# Patient Record
Sex: MALE | URBAN - METROPOLITAN AREA
[De-identification: names, ages, dates, MRNs, and addresses within clinical notes are randomized per-mention and may not be internally consistent; named-entity substitution may affect disease eponyms.]

---

## 2022-09-28 ENCOUNTER — CLAIMS CREATED FROM THE CLAIM WINDOW (OUTPATIENT)
Dept: URBAN - METROPOLITAN AREA CLINIC 113 | Facility: CLINIC | Age: 72
End: 2022-09-28
Payer: MEDICARE

## 2022-09-28 ENCOUNTER — DASHBOARD ENCOUNTERS (OUTPATIENT)
Age: 72
End: 2022-09-28

## 2022-09-28 ENCOUNTER — LAB OUTSIDE AN ENCOUNTER (OUTPATIENT)
Dept: URBAN - METROPOLITAN AREA CLINIC 113 | Facility: CLINIC | Age: 72
End: 2022-09-28

## 2022-09-28 ENCOUNTER — TELEPHONE ENCOUNTER (OUTPATIENT)
Dept: URBAN - METROPOLITAN AREA CLINIC 113 | Facility: CLINIC | Age: 72
End: 2022-09-28

## 2022-09-28 VITALS
DIASTOLIC BLOOD PRESSURE: 72 MMHG | HEART RATE: 66 BPM | TEMPERATURE: 97.1 F | WEIGHT: 175 LBS | BODY MASS INDEX: 25.05 KG/M2 | SYSTOLIC BLOOD PRESSURE: 130 MMHG | RESPIRATION RATE: 22 BRPM | HEIGHT: 70 IN

## 2022-09-28 DIAGNOSIS — K52.9 CHRONIC DIARRHEA: ICD-10-CM

## 2022-09-28 DIAGNOSIS — K86.1 CHRONIC PANCREATITIS, UNSPECIFIED PANCREATITIS TYPE: ICD-10-CM

## 2022-09-28 DIAGNOSIS — R19.7 ACUTE DIARRHEA: ICD-10-CM

## 2022-09-28 PROBLEM — 235494005: Status: ACTIVE | Noted: 2022-09-28

## 2022-09-28 PROCEDURE — 99204 OFFICE O/P NEW MOD 45 MIN: CPT | Performed by: STUDENT IN AN ORGANIZED HEALTH CARE EDUCATION/TRAINING PROGRAM

## 2022-09-28 RX ORDER — ZINC OXIDE 400 MG/G
AS DIRECTED PASTE TOPICAL
Status: ACTIVE | COMMUNITY

## 2022-09-28 RX ORDER — INSULIN ASPART 100 [IU]/ML
AS DIRECTED INJECTION, SOLUTION INTRAVENOUS; SUBCUTANEOUS
Status: ACTIVE | COMMUNITY

## 2022-09-28 RX ORDER — INSULIN DETEMIR 100 [IU]/ML
AS DIRECTED INJECTION, SOLUTION SUBCUTANEOUS
Status: ACTIVE | COMMUNITY

## 2022-09-28 RX ORDER — NUTRITIONAL SUPPLEMENT
AS DIRECTED POWDER (GRAM) ORAL
Status: ACTIVE | COMMUNITY

## 2022-09-28 RX ORDER — KRILL/OM-3/DHA/EPA/PHOSPHO/AST 1000-230MG
1 TABLET CAPSULE ORAL ONCE A DAY
Status: ACTIVE | COMMUNITY

## 2022-09-28 RX ORDER — PRAVASTATIN SODIUM 40 MG/1
1 TABLET TABLET ORAL ONCE A DAY
Status: ACTIVE | COMMUNITY

## 2022-09-28 RX ORDER — ACETAMINOPHEN 160 MG/5ML
AS DIRECTED SOLUTION ORAL
Status: ACTIVE | COMMUNITY

## 2022-09-28 RX ORDER — DONEPEZIL HYDROCHLORIDE 10 MG/1
1 TABLET AT BEDTIME TABLET, FILM COATED ORAL ONCE A DAY
Status: ACTIVE | COMMUNITY

## 2022-09-28 RX ORDER — AMLODIPINE BESYLATE 5 MG/1
1 TABLET TABLET ORAL ONCE A DAY
Status: ACTIVE | COMMUNITY

## 2022-09-28 RX ORDER — PANCRELIPASE LIPASE, PANCRELIPASE PROTEASE, PANCRELIPASE AMYLASE 20000; 63000; 84000 [USP'U]/1; [USP'U]/1; [USP'U]/1
TAKE 63,000 UNITS WITH PEG TUBE FEEDINGS AND 20,000 UNITS WITH ORAL FEEDINGS. WILL NEED TO OPEN CAPSULES AND MIX CONTENTS WITH APPLE SAUCE TO ADMINISTER CAPSULE, DELAYED RELEASE ORAL
Qty: 450 CAPSULE | Refills: 0 | OUTPATIENT

## 2022-09-28 RX ORDER — FOLIC ACID 1 MG/1
1 TABLET TABLET ORAL ONCE A DAY
Status: ACTIVE | COMMUNITY

## 2022-09-28 RX ORDER — CARVEDILOL 12.5 MG/1
1 TABLET WITH FOOD TABLET, FILM COATED ORAL TWICE A DAY
Status: ACTIVE | COMMUNITY

## 2022-09-28 RX ORDER — MULTIVIT/FOLIC ACID/ZINC/VIT C 400-50-500
AS DIRECTED CAPSULE ORAL
Status: ACTIVE | COMMUNITY

## 2022-09-28 RX ORDER — MEMANTINE HYDROCHLORIDE 5 MG/1
1 TABLET TABLET ORAL ONCE A DAY
Status: ACTIVE | COMMUNITY

## 2022-09-28 RX ORDER — TUBERCULIN PURIFIED PROTEIN DERIVATIVE 5 [IU]/.1ML
AS DIRECTED INJECTION, SOLUTION INTRADERMAL
Status: ACTIVE | COMMUNITY

## 2022-09-28 NOTE — HPI-TODAY'S VISIT:
Mr. Lew is a 72-year-old male with a past medical history significant for CVA, cervical spine stenosis, diabetes mellitus, GERD, CHF, hypertension, ESRD on dialysis. He presents to the GI office for evaluation of diarrhea. . He presents today in a stretcher. His niece who is his current caregiver is present at bedside. He resides in a nursing facility at this time. . Patient's health has clinically declined over the last year after he was diagnosed with cervical spine stenosis and underwent surgical repair.  This was performed in [place], [place]. he suffered a fall in the hospital after his first surgery and had to go to the OR for a redo operation.  He briefly required use of a gastric feeding tube around this time.  He had developed diarrhea after the surgery which he had never had before and was told that it might improve spontaneously.  Per the patient's niece, the patient has been dealing with diarrhea since and his bowel movements have not returned to normal.  The patient was moved to [place] and into a nursing home.  He had been doing well with physical therapy and rehab at the time however he suffered a stroke.  No intervention was required and patient was started on aspirin therapy.  Since his stroke he has been bedbound and requires assistance with all of his ADLs.  He had his PEG tube replaced and is currently receiving bolus feedings but also eats by mouth occasionally.  Patient's niece notes that he will infrequently cough while eating.  Patient had a CT head without contrast 4/21/2022 that demonstrated a remote infarction of the right thalamus and encephalomalacia in the inferior frontal lobes, no acute abnormality.  He also had a CT abdomen/pelvis without contrast that revealed an indeterminate 8.4 cm region within the left hepatic lobe with central calcifications concerning for mass, MRI was recommended.  There was evidence of gastroesophageal reflux on the CT scan with oral contrast.  CT scan also notes extensive pancreatic calcifications mostly concentrated at the head, neck and body without surrounding inflammation. . Most recent blood work in our records from 9/12/2022 show WBC 6.4, hemoglobin 10.3, , platelet 188.  Sodium 144, potassium 7.5, chloride 112, , creatinine 9.55.  LFTs unremarkable.  INR 1.07. . Main complaint today is diarrhea. Duration is one year. This was also discussed with the nurse who takes care of him at his nursing home.  Patient has several episodes of loose watery stool exacerbated by feeding.  He has approximately 5 bowel movements in a 12-hour period.  No blood noted in his stool.  Patient denies any prior colonoscopy.  He was started on banana flakes and Imodium 2 mg twice a day which has thicken his stools however the frequency has been unchanged. He has been turned away from dialysis in the past because of diarrhea.

## 2022-09-28 NOTE — PHYSICAL EXAM GASTROINTESTINAL
Abdomen , soft, nontender, nondistended , no guarding or rigidity , no masses palpable , normal bowel sounds , PEG tube insertion site clean without drainage. Liver and Spleen , no hepatomegaly present , no hepatosplenomegaly , liver nontender , spleen not palpable